# Patient Record
Sex: MALE | Race: BLACK OR AFRICAN AMERICAN | NOT HISPANIC OR LATINO | Employment: FULL TIME | URBAN - METROPOLITAN AREA
[De-identification: names, ages, dates, MRNs, and addresses within clinical notes are randomized per-mention and may not be internally consistent; named-entity substitution may affect disease eponyms.]

---

## 2017-07-03 ENCOUNTER — HOSPITAL ENCOUNTER (EMERGENCY)
Facility: CLINIC | Age: 42
Discharge: HOME | End: 2017-07-03
Attending: FAMILY MEDICINE | Admitting: FAMILY MEDICINE

## 2017-07-03 VITALS
RESPIRATION RATE: 16 BRPM | SYSTOLIC BLOOD PRESSURE: 101 MMHG | OXYGEN SATURATION: 95 % | TEMPERATURE: 100 F | DIASTOLIC BLOOD PRESSURE: 72 MMHG | WEIGHT: 151.5 LBS

## 2017-07-03 DIAGNOSIS — J02.0 ACUTE STREPTOCOCCAL PHARYNGITIS: ICD-10-CM

## 2017-07-03 LAB
DEPRECATED S PYO AG THROAT QL EIA: ABNORMAL
MICRO REPORT STATUS: ABNORMAL
SPECIMEN SOURCE: ABNORMAL

## 2017-07-03 PROCEDURE — 99284 EMERGENCY DEPT VISIT MOD MDM: CPT | Mod: GC | Performed by: FAMILY MEDICINE

## 2017-07-03 PROCEDURE — 25000132 ZZH RX MED GY IP 250 OP 250 PS 637: Performed by: STUDENT IN AN ORGANIZED HEALTH CARE EDUCATION/TRAINING PROGRAM

## 2017-07-03 PROCEDURE — 87880 STREP A ASSAY W/OPTIC: CPT | Performed by: STUDENT IN AN ORGANIZED HEALTH CARE EDUCATION/TRAINING PROGRAM

## 2017-07-03 PROCEDURE — 99283 EMERGENCY DEPT VISIT LOW MDM: CPT | Performed by: FAMILY MEDICINE

## 2017-07-03 RX ORDER — AMOXICILLIN 500 MG/1
500 CAPSULE ORAL 3 TIMES DAILY
Qty: 30 CAPSULE | Refills: 0 | Status: SHIPPED | OUTPATIENT
Start: 2017-07-03 | End: 2017-07-13

## 2017-07-03 RX ORDER — IBUPROFEN 800 MG/1
800 TABLET, FILM COATED ORAL ONCE
Status: COMPLETED | OUTPATIENT
Start: 2017-07-03 | End: 2017-07-03

## 2017-07-03 RX ADMIN — IBUPROFEN 800 MG: 800 TABLET, FILM COATED ORAL at 21:18

## 2017-07-03 ASSESSMENT — ENCOUNTER SYMPTOMS
FACIAL SWELLING: 0
NECK STIFFNESS: 0
TROUBLE SWALLOWING: 1
CHILLS: 1
FEVER: 1
NECK PAIN: 0
WHEEZING: 0
APPETITE CHANGE: 1
VOMITING: 1
SINUS PRESSURE: 0
NAUSEA: 1
COUGH: 0
SORE THROAT: 1

## 2017-07-03 NOTE — ED AVS SNAPSHOT
[facility], Emergency Department    [address]    [place] [address]    Phone:  [phone]    Fax:  [phone]                                       Praveen Shahid   MRN: [id number]    Department:  [facility], Emergency Department   Date of Visit:  7/3/2017           Patient Information     Date Of Birth          1975        Your diagnoses for this visit were:     Acute streptococcal pharyngitis        You were seen by Gene Mcghee MD.      Follow-up Information     Follow up with primary MD as needed.        Discharge Instructions         Pharyngitis: Strep (Confirmed)    You have had a positive test for strep throat. Strep throat is a contagious illness. It is spread by coughing, kissing or by touching others after touching your mouth or nose. Symptoms include throat pain that is worse with swallowing, aching all over, headache, and fever. It is treated with antibiotic medicine. This should help you start to feel better in 1 to 2 days.  Home care    Rest at home. Drink plenty of fluids to you won't get dehydrated.    No work or school for the first 2 days of taking the antibiotics. After this time, you will not be contagious. You can then return to school or work if you are feeling better.     Take antibiotic medicine for the full 10 days, even if you feel better. This is very important to ensure the infection is treated. It is also important to prevent medicine-resistant germs from developing. If you were given an antibiotic shot, you don't need any more antibiotics.    You may use acetaminophen or ibuprofen to control pain or fever, unless another medicine was prescribed for this. Talk with your doctor before taking these medicines if you have chronic liver or kidney disease. Also talk with your doctor if you have had a stomach ulcer or GI bleeding.    Throat lozenges or sprays help reduce pain. Gargling with warm saltwater will also reduce throat pain. Dissolve 1/2  teaspoon of salt in 1 glass of warm water. This may be useful just before meals.     Soft foods are OK. Avoid salty or spicy foods.  Follow-up care  Follow up with your healthcare provider or our staff if you don't get better over the next week.  When to seek medical advice  Call your healthcare provider right away if any of these occur:    Fever of 100.4 F (38 C) or higher, or as directed by your healthcare provider    New or worsening ear pain, sinus pain, or headache    Painful lumps in the back of neck    Stiff neck    Lymph nodes getting larger or becoming soft in the middle    You can't swallow liquids or you can't open your mouth wide because of throat pain    Signs of dehydration. These include very dark urine or no urine, sunken eyes, and dizziness.    Trouble breathing or noisy breathing    Muffled voice    Rash  Date Last Reviewed: 4/13/2015 2000-2017 [employer]. [address] [place] [address]. All rights reserved. This information is not intended as a substitute for professional medical care. Always follow your healthcare professional's instructions.          24 Hour Appointment Hotline       To make an appointment at any [facility], call [phone]). If you don't have a family doctor or clinic, we will help you find one. [place] clinics are conveniently located to serve the needs of you and your family.             Review of your medicines      START taking        Dose / Directions Last dose taken    amoxicillin 500 MG capsule   Commonly known as:  AMOXIL   Dose:  500 mg   Quantity:  30 capsule        Take 1 capsule (500 mg) by mouth 3 times daily for 10 days   Refills:  0          Our records show that you are taking the medicines listed below. If these are incorrect, please call your family doctor or clinic.        Dose / Directions Last dose taken    ACETAMINOPHEN PO   Dose:  1000 mg        Take 1,000 mg by mouth every 6 hours as needed for pain  "  Refills:  0                Prescriptions were sent or printed at these locations (1 Prescription)                   Other Prescriptions                Printed at Department/Unit printer (1 of 1)         amoxicillin (AMOXIL) 500 MG capsule                Procedures and tests performed during your visit     Rapid strep screen      Orders Needing Specimen Collection     None      Pending Results     No orders found from 2017 to 2017.            Pending Culture Results     No orders found from 2017 to 2017.            Pending Results Instructions     If you had any lab results that were not finalized at the time of your Discharge, you can call the ED Lab Result RN at [phone]. You will be contacted by this team for any positive Lab results or changes in treatment. The nurses are available 7 days a week from 10A to 6:30P.  You can leave a message 24 hours per day and they will return your call.        Thank you for choosing [facility]       Thank you for choosing [facility] for your care. Our goal is always to provide you with excellent care. Hearing back from our patients is one way we can continue to improve our services. Please take a few minutes to complete the written survey that you may receive in the mail after you visit with us. Thank you!        [employer] Information     [employer] lets you send messages to your doctor, view your test results, renew your prescriptions, schedule appointments and more. To sign up, go to [email or web address] . Click on \"Log in\" on the left side of the screen, which will take you to the Welcome page. Then click on \"Sign up Now\" on the right side of the page.     You will be asked to enter the access code listed below, as well as some personal information. Please follow the directions to create your username and password.     Your access code is: [email or web address]  Expires: 10/1/2017 10:45 PM     Your access code will  in 90 days. If you need help or a new code, " please call your [facility] [place] clinic or [phone].        Care EveryWhere ID     This is your Care EveryWhere ID. This could be used by other organizations to access your [facility] [place] medical records  [id number]        Equal Access to Services     GWEN EDGE : Blessing Cheatham, waalexandroda luqadaha, qaybta kaalmada annmarie, pacheco hathaway. So [facility] [phone].    ATENCIÓN: Si habla español, tiene a aguilar disposición servicios gratuitos de asistencia lingüística. Llame al [phone].    We comply with applicable federal civil rights laws and [place] laws. We do not discriminate on the basis of race, color, national origin, age, disability sex, sexual orientation or gender identity.            After Visit Summary       This is your record. Keep this with you and show to your community pharmacist(s) and doctor(s) at your next visit.

## 2017-07-03 NOTE — ED AVS SNAPSHOT
[facility], [place], Emergency Department    [address]    [facility] [address]    Phone:  [phone]    Fax:  [phone]                                       Praveen Shahid   MRN: [id number]    Department:  [facility], Emergency Department   Date of Visit:  7/3/2017           After Visit Summary Signature Page     I have received my discharge instructions, and my questions have been answered. I have discussed any challenges I see with this plan with the nurse or doctor.    ..........................................................................................................................................  Patient/Patient Representative Signature      ..........................................................................................................................................  Patient Representative Print Name and Relationship to Patient    ..................................................               ................................................  Date                                            Time    ..........................................................................................................................................  Reviewed by Signature/Title    ...................................................              ..............................................  Date                                                            Time

## 2017-07-04 NOTE — DISCHARGE INSTRUCTIONS
Pharyngitis: Strep (Confirmed)    You have had a positive test for strep throat. Strep throat is a contagious illness. It is spread by coughing, kissing or by touching others after touching your mouth or nose. Symptoms include throat pain that is worse with swallowing, aching all over, headache, and fever. It is treated with antibiotic medicine. This should help you start to feel better in 1 to 2 days.  Home care    Rest at home. Drink plenty of fluids to you won't get dehydrated.    No work or school for the first 2 days of taking the antibiotics. After this time, you will not be contagious. You can then return to school or work if you are feeling better.     Take antibiotic medicine for the full 10 days, even if you feel better. This is very important to ensure the infection is treated. It is also important to prevent medicine-resistant germs from developing. If you were given an antibiotic shot, you don't need any more antibiotics.    You may use acetaminophen or ibuprofen to control pain or fever, unless another medicine was prescribed for this. Talk with your doctor before taking these medicines if you have chronic liver or kidney disease. Also talk with your doctor if you have had a stomach ulcer or GI bleeding.    Throat lozenges or sprays help reduce pain. Gargling with warm saltwater will also reduce throat pain. Dissolve 1/2 teaspoon of salt in 1 glass of warm water. This may be useful just before meals.     Soft foods are OK. Avoid salty or spicy foods.  Follow-up care  Follow up with your healthcare provider or our staff if you don't get better over the next week.  When to seek medical advice  Call your healthcare provider right away if any of these occur:    Fever of 100.4 F (38 C) or higher, or as directed by your healthcare provider    New or worsening ear pain, sinus pain, or headache    Painful lumps in the back of neck    Stiff neck    Lymph nodes getting larger or becoming soft in the  middle    You can't swallow liquids or you can't open your mouth wide because of throat pain    Signs of dehydration. These include very dark urine or no urine, sunken eyes, and dizziness.    Trouble breathing or noisy breathing    Muffled voice    Rash  Date Last Reviewed: 4/13/2015 2000-2017 [employer]. [address] [place] [address], [place] [address]. All rights reserved. This information is not intended as a substitute for professional medical care. Always follow your healthcare professional's instructions.

## 2017-07-04 NOTE — ED NOTES
Started on Saturday afternoon with sore throat and fever.  Now getting worse.  Nausea no vomiting, very tired.

## 2017-07-04 NOTE — ED PROVIDER NOTES
"  History     Chief Complaint   Patient presents with     Pharyngitis     Fever     HPI  Praveen Shahid is a 41 year old male who presents with a chief complaint of sore throat since Saturday at 8pm.  He never took his temperature at home, but had chills and \"broke\" the fever with sweating after taking tylenol which was last taken at 5pm today.  He has been nauseous with one episode of \"green\" vomit because he hasn't been able to eat much.  He states he has been able to drink fluids.  His wife and kids have not been sick and he has been staying away from them.  No cough, but has a stuffy nose.    I have reviewed the Medications, Allergies, Past Medical and Surgical History, and Social History in the Epic system.    Review of Systems   Constitutional: Positive for appetite change, chills and fever.   HENT: Positive for congestion, sore throat and trouble swallowing. Negative for ear pain, facial swelling and sinus pressure.    Respiratory: Negative for cough and wheezing.    Gastrointestinal: Positive for nausea and vomiting (One episode today).   Musculoskeletal: Negative for neck pain and neck stiffness.       Physical Exam   BP: 101/72  Heart Rate: 95  Temp: 100.2  F (37.9  C)  Resp: 16  Weight: 68.7 kg (151 lb 8 oz)  SpO2: 95 %  Physical Exam   Constitutional: He is oriented to person, place, and time. He appears well-developed and well-nourished. He appears distressed (Mild ).   HENT:   Pharyngeal inflammation without exudate and enlarged tonsils   Neck: Neck supple.   No nuchal rigidity   Cardiovascular: Normal rate.    Pulmonary/Chest: Effort normal and breath sounds normal.   Lymphadenopathy:     He has cervical adenopathy (Anterior).   Neurological: He is alert and oriented to person, place, and time.   Skin:   Slight delay in capillary refill and skin tenting       ED Course     ED Course   Rapid strep test ordered  Procedures               Labs Ordered and Resulted from Time of ED Arrival Up to the Time of " Departure from the ED - No data to display         Assessments & Plan (with Medical Decision Making)   Praveen Shahid is a 41 year old male who present with a sore throat and fever for 48 hours.  Rapid test was positive for acute streptococcal pharyngitis.    I have reviewed the nursing notes.    I have reviewed the findings, diagnosis, plan and need for follow up with the patient.  This data collected with the Resident working in the Emergency Department.  Patient was seen and evaluated by myself and I repeated the history and physical exam with the patient.  The plan of care was discussed with them.  The key portions of the note including the entire assessment and plan reflect my documentation.          Discharge Medication List as of 7/3/2017 10:46 PM      START taking these medications    Details   amoxicillin (AMOXIL) 500 MG capsule Take 1 capsule (500 mg) by mouth 3 times daily for 10 days, Disp-30 capsule, R-0, Local Print             Final diagnoses:   Acute streptococcal pharyngitis       7/3/2017   [facility], [place], EMERGENCY DEPARTMENT    Evelyn Bacon, FP1     Gene Mcghee MD  07/06/17 1527

## 2018-03-12 ENCOUNTER — OFFICE VISIT (OUTPATIENT)
Dept: URGENT CARE | Facility: URGENT CARE | Age: 43
End: 2018-03-12
Payer: COMMERCIAL

## 2018-03-12 VITALS
OXYGEN SATURATION: 96 % | SYSTOLIC BLOOD PRESSURE: 123 MMHG | DIASTOLIC BLOOD PRESSURE: 69 MMHG | TEMPERATURE: 98.4 F | HEART RATE: 79 BPM | WEIGHT: 164.6 LBS

## 2018-03-12 DIAGNOSIS — R50.9 FEVER, UNSPECIFIED FEVER CAUSE: Primary | ICD-10-CM

## 2018-03-12 DIAGNOSIS — B34.9 VIRAL SYNDROME: ICD-10-CM

## 2018-03-12 LAB
BASOPHILS # BLD AUTO: 0 10E9/L (ref 0–0.2)
BASOPHILS NFR BLD AUTO: 0.2 %
DIFFERENTIAL METHOD BLD: ABNORMAL
EOSINOPHIL # BLD AUTO: 0 10E9/L (ref 0–0.7)
EOSINOPHIL NFR BLD AUTO: 0.6 %
ERYTHROCYTE [DISTWIDTH] IN BLOOD BY AUTOMATED COUNT: 13 % (ref 10–15)
HCT VFR BLD AUTO: 40.5 % (ref 40–53)
HGB BLD-MCNC: 13.6 G/DL (ref 13.3–17.7)
LYMPHOCYTES # BLD AUTO: 1.4 10E9/L (ref 0.8–5.3)
LYMPHOCYTES NFR BLD AUTO: 22.6 %
MCH RBC QN AUTO: 31.4 PG (ref 26.5–33)
MCHC RBC AUTO-ENTMCNC: 33.6 G/DL (ref 31.5–36.5)
MCV RBC AUTO: 94 FL (ref 78–100)
MONOCYTES # BLD AUTO: 1 10E9/L (ref 0–1.3)
MONOCYTES NFR BLD AUTO: 15.7 %
NEUTROPHILS # BLD AUTO: 3.8 10E9/L (ref 1.6–8.3)
NEUTROPHILS NFR BLD AUTO: 60.9 %
PLATELET # BLD AUTO: 171 10E9/L (ref 150–450)
RBC # BLD AUTO: 4.33 10E12/L (ref 4.4–5.9)
WBC # BLD AUTO: 6.3 10E9/L (ref 4–11)

## 2018-03-12 PROCEDURE — 85025 COMPLETE CBC W/AUTO DIFF WBC: CPT | Performed by: PHYSICIAN ASSISTANT

## 2018-03-12 PROCEDURE — 36415 COLL VENOUS BLD VENIPUNCTURE: CPT | Performed by: PHYSICIAN ASSISTANT

## 2018-03-12 PROCEDURE — 99213 OFFICE O/P EST LOW 20 MIN: CPT | Performed by: PHYSICIAN ASSISTANT

## 2018-03-12 NOTE — MR AVS SNAPSHOT
"              After Visit Summary   3/12/2018    Asa Shahid    MRN: [id number]           Patient Information     Date Of Birth          1975        Visit Information        Provider Department      3/12/2018 7:40 PM Corinne Villanueva PA-C [facility]        Today's Diagnoses     Fever, unspecified fever cause    -  1    Viral syndrome           Follow-ups after your visit        Who to contact     If you have questions or need follow up information about today's clinic visit or your schedule please contact [facility] directly at [phone].  Normal or non-critical lab and imaging results will be communicated to you by [facility], letter or phone within 4 business days after the clinic has received the results. If you do not hear from us within 7 days, please contact the clinic through [facility] or phone. If you have a critical or abnormal lab result, we will notify you by phone as soon as possible.  Submit refill requests through [facility] [employer] or call your pharmacy and they will forward the refill request to us. Please allow 3 business days for your refill to be completed.          Additional Information About Your Visit        MyChart Information     [facility] [employer] lets you send messages to your doctor, view your test results, renew your prescriptions, schedule appointments and more. To sign up, go to [email or web address] . Click on \"Log in\" on the left side of the screen, which will take you to the Welcome page. Then click on \"Sign up Now\" on the right side of the page.     You will be asked to enter the access code listed below, as well as some personal information. Please follow the directions to create your username and password.     Your access code is: [email or web address]  Expires: 6/10/2018  9:28 PM     Your access code will  in 90 days. If you need help or a new code, please call your [facility] or [phone].        Care EveryWhere ID     This is your Care " EveryWhere ID. This could be used by other organizations to access your [facility] medical records  [id number]        Your Vitals Were     Pulse Temperature Pulse Oximetry             79 98.4  F (36.9  C) (Oral) 96%          Blood Pressure from Last 3 Encounters:   03/12/18 123/69   07/03/17 101/72   08/29/16 109/76    Weight from Last 3 Encounters:   03/12/18 164 lb 9.6 oz (74.7 kg)   07/03/17 151 lb 8 oz (68.7 kg)   08/29/16 156 lb (70.8 kg)              We Performed the Following     CBC with platelets differential        Primary Care Provider Fax #    Physician No Ref-Primary [phone]       No address on file        Equal Access to Services     GWEN EDGE : Blessing Cheatham, linda almaraz, qaberkley kaalmada annmarie, pacheco sabillon . So [facility] [phone].    ATENCIÓN: Si habla español, tiene a aguilar disposición servicios gratuitos de asistencia lingüística. Llame al [phone].    We comply with applicable federal civil rights laws and [place] laws. We do not discriminate on the basis of race, color, national origin, age, disability, sex, sexual orientation, or gender identity.            Thank you!     Thank you for choosing [facility]  for your care. Our goal is always to provide you with excellent care. Hearing back from our patients is one way we can continue to improve our services. Please take a few minutes to complete the written survey that you may receive in the mail after your visit with us. Thank you!             Your Updated Medication List - Protect others around you: Learn how to safely use, store and throw away your medicines at www.disposemymeds.org.          This list is accurate as of 3/12/18  9:28 PM.  Always use your most recent med list.                   Brand Name Dispense Instructions for use Diagnosis    ACETAMINOPHEN PO      Take 1,000 mg by mouth every 6 hours as needed for pain        loratadine 10 MG tablet     CLARITIN     Take 10 mg by mouth daily

## 2018-03-13 NOTE — PROGRESS NOTES
S: He says he has had a high fever  For 3 days. Felt warm, did not take jhis temp. Some chills. No other symptoms.      Allergies   Allergen Reactions     Seasonal Allergies        No past medical history on file.      Current Outpatient Prescriptions on File Prior to Visit:  ACETAMINOPHEN PO Take 1,000 mg by mouth every 6 hours as needed for pain   loratadine (CLARITIN) 10 MG tablet Take 10 mg by mouth daily     No current facility-administered medications on file prior to visit.     Social History   Substance Use Topics     Smoking status: Never Smoker     Smokeless tobacco: Never Used     Alcohol use No       ROS:  CONSTITUTIONAL: Negative for fatigue or fever.  EYES: Negative for eye problems.  ENT:No ST.  RESP: No cough..  CV: Negative for chest pains.  GI: Negative for vomiting.  MUSCULOSKELETAL:  Negative for significant muscle or joint pains.  NEUROLOGIC: Negative for headaches.  SKIN: Negative for rash.  - no dysuria    OBJECTIVE:  /69 (BP Location: Left arm, Patient Position: Chair, Cuff Size: Adult Regular)  Pulse 79  Temp 98.4  F (36.9  C) (Oral)  Wt 164 lb 9.6 oz (74.7 kg)  SpO2 96%  GENERAL APPEARANCE: Healthy, alert and no distress.  EYES:Conjunctiva/sclera clear.  EARS: No cerumen.   Ear canals w/o erythema.  TM's intact w/o erythema.    NOSE/MOUTH: Nose without ulcers, erythema or lesions.  SINUSES: No maxillary sinus tenderness.  THROAT: No erythema w/o tonsillar enlargement . No exudates.  NECK: Supple, nontender, no lymphadenopathy.  RESP: Lungs clear to auscultation - no rales, rhonchi or wheezes  CV: Regular rate and rhythm, normal S1 S2, no murmur noted.  NEURO: Awake, alert    SKIN: No rashes  Abd- soft, NT, no HSM  Results for orders placed or performed in visit on 03/12/18   CBC with platelets differential   Result Value Ref Range    WBC 6.3 4.0 - 11.0 10e9/L    RBC Count 4.33 (L) 4.4 - 5.9 10e12/L    Hemoglobin 13.6 13.3 - 17.7 g/dL    Hematocrit 40.5 40.0 - 53.0 %    MCV 94 78  - 100 fl    MCH 31.4 26.5 - 33.0 pg    MCHC 33.6 31.5 - 36.5 g/dL    RDW 13.0 10.0 - 15.0 %    Platelet Count 171 150 - 450 10e9/L    Diff Method Automated Method     % Neutrophils 60.9 %    % Lymphocytes 22.6 %    % Monocytes 15.7 %    % Eosinophils 0.6 %    % Basophils 0.2 %    Absolute Neutrophil 3.8 1.6 - 8.3 10e9/L    Absolute Lymphocytes 1.4 0.8 - 5.3 10e9/L    Absolute Monocytes 1.0 0.0 - 1.3 10e9/L    Absolute Eosinophils 0.0 0.0 - 0.7 10e9/L    Absolute Basophils 0.0 0.0 - 0.2 10e9/L         ASSESSMENT:       ICD-10-CM    1. Fever, unspecified fever cause R50.9 CBC with platelets differential   2. Viral syndrome B34.9        PLAN:Observe.  Lots of rest and fluids.  RTC if any worsening symptoms or if not improving.    Corinne Villanueva PA-C

## 2018-03-13 NOTE — NURSING NOTE
Chief Complaint   Patient presents with     Fever     Patient complains of fever x 3 days       Initial /69 (BP Location: Left arm, Patient Position: Chair, Cuff Size: Adult Regular)  Pulse 79  Temp 98.4  F (36.9  C) (Oral)  Wt 164 lb 9.6 oz (74.7 kg)  SpO2 96% There is no height or weight on file to calculate BMI.  Medication Reconciliation: complete       Yoko Campbell

## 2018-05-31 ENCOUNTER — OFFICE VISIT (OUTPATIENT)
Dept: URGENT CARE | Facility: URGENT CARE | Age: 43
End: 2018-05-31
Payer: COMMERCIAL

## 2018-05-31 VITALS
HEART RATE: 79 BPM | WEIGHT: 162.2 LBS | RESPIRATION RATE: 16 BRPM | DIASTOLIC BLOOD PRESSURE: 69 MMHG | TEMPERATURE: 98.4 F | SYSTOLIC BLOOD PRESSURE: 101 MMHG | OXYGEN SATURATION: 99 %

## 2018-05-31 DIAGNOSIS — R14.2 FLATULENCE, ERUCTATION AND GAS PAIN: Primary | ICD-10-CM

## 2018-05-31 DIAGNOSIS — R14.1 FLATULENCE, ERUCTATION AND GAS PAIN: Primary | ICD-10-CM

## 2018-05-31 DIAGNOSIS — R14.3 FLATULENCE, ERUCTATION AND GAS PAIN: Primary | ICD-10-CM

## 2018-05-31 PROCEDURE — 99213 OFFICE O/P EST LOW 20 MIN: CPT | Performed by: NURSE PRACTITIONER

## 2018-05-31 RX ORDER — SIMETHICONE 80 MG
80 TABLET,CHEWABLE ORAL EVERY 6 HOURS PRN
Qty: 40 TABLET | Refills: 0 | Status: SHIPPED | OUTPATIENT
Start: 2018-05-31 | End: 2018-06-05

## 2018-05-31 ASSESSMENT — ENCOUNTER SYMPTOMS
VOMITING: 0
SHORTNESS OF BREATH: 0
RHINORRHEA: 0
ROS GI COMMENTS: BLOATING AND GAS
SORE THROAT: 0
NAUSEA: 0
CHILLS: 0
DIARRHEA: 0
COUGH: 0
DIAPHORESIS: 0
FEVER: 0

## 2018-05-31 NOTE — PROGRESS NOTES
SUBJECTIVE:   Asa Shahid is a 42 year old male presenting with a chief complaint of   Chief Complaint   Patient presents with     Abdominal Pain     Patient complains of stomach ache and frequent bowel movements. Patient just got back from [place] on Saturday ( 6 days ago)       He is an established patient of [facility].    Gastro    Onset of symptoms was 6 day(s) ago.  Course of illness is same.    Severity moderate  Current and Associated symptoms:  Bloating and gas in stomach  Aggravating factors: nothing.    Alleviating factors:nothing  Diarrhea: No  Stools: frequent but normal  Vomitting: No  Appetite: normal  Risk factors: travel       Review of Systems   Constitutional: Negative for chills, diaphoresis and fever.   HENT: Negative for congestion, ear pain, rhinorrhea and sore throat.    Respiratory: Negative for cough and shortness of breath.    Gastrointestinal: Negative for diarrhea, nausea and vomiting.        Bloating and gas       History reviewed. No pertinent past medical history.  History reviewed. No pertinent family history.  Current Outpatient Prescriptions   Medication Sig Dispense Refill     ACETAMINOPHEN PO Take 1,000 mg by mouth every 6 hours as needed for pain       loratadine (CLARITIN) 10 MG tablet Take 10 mg by mouth daily       Social History   Substance Use Topics     Smoking status: Never Smoker     Smokeless tobacco: Never Used     Alcohol use No       OBJECTIVE  /69  Pulse 79  Temp 98.4  F (36.9  C) (Oral)  Resp 16  Wt 162 lb 3.2 oz (73.6 kg)  SpO2 99%    Physical Exam   Constitutional: He appears well-developed and well-nourished. No distress.   HENT:   Head: Normocephalic and atraumatic.   Right Ear: Tympanic membrane and external ear normal.   Left Ear: Tympanic membrane and external ear normal.   Mouth/Throat: Oropharynx is clear and moist.   Eyes: EOM are normal. Pupils are equal, round, and reactive to light.   Neck: Normal range of motion. Neck supple.    Pulmonary/Chest: Effort normal and breath sounds normal. No respiratory distress.   Lymphadenopathy:     He has no cervical adenopathy.   Neurological: He is alert. No cranial nerve deficit.   Skin: Skin is warm and dry. He is not diaphoretic.   Psychiatric: He has a normal mood and affect.   Nursing note and vitals reviewed.        ASSESSMENT:      ICD-10-CM    1. Flatulence, eructation and gas pain R14.3     R14.1     R14.2       PLAN:  I recommend follow up with PCP in 3 days or sooner if symptoms are getting worse  Advised to avoid food that cause gas, and drink a lot of fluids.  Side effects of medications discussed  Over the counter medications discussed  All questions are answered and patient is in agreement with treatment plan  Zohra Sanchez  [facility]-BC  Family Nurse Practitoner

## 2018-05-31 NOTE — PATIENT INSTRUCTIONS
Simethicone chewable tablets  Brand Names: Gas Relief, Gas-X, Gas-X Extra Strength, Genasyme, Mylanta Gas, Mytab Gas, Phazyme  What is this medicine?  SIMETHICONE (sye METH i trish) is used to decrease the discomfort caused by gas.  How should I use this medicine?  Take this medicine by mouth. Crush or chew the tablets. Do not swallow them whole. Follow the directions on the label or those given to you by your doctor or health care professional. Do not take your medicine more often than directed.  Talk to your pediatrician regarding the use of this medicine in children. While this medicine may be used in children as young as 12 years for selected conditions, precautions do apply.  What side effects may I notice from receiving this medicine?  There are no reported side effects of this medicine.  What may interact with this medicine?  Interactions are not expected.  What if I miss a dose?  This does not apply. You will only use this medicine as needed for gas pain. Do not use double or extra doses.  Where should I keep my medicine?  Keep out of the reach of children.  Store at room temperature between 15 and 30 degrees C (59 and 86 degrees F). Keep container tightly closed. Throw away any unused medicine after the expiration date.  What should I tell my health care provider before I take this medicine?  They need to know if you have any of these conditions:    phenylketonuria    an unusual or allergic reaction to simethicone, other medicines, foods, dyes, or preservatives    pregnant or trying to get pregnant    breast-feeding  What should I watch for while using this medicine?  Tell your doctor or health care professional if your symptoms get worse, or if you have severe pain, diarrhea, constipation, or blood in your stool. These could be signs of a more serious condition.  NOTE:This sheet is a summary. It may not cover all possible information. If you have questions about this medicine, talk to your doctor,  pharmacist, or health care provider. Copyright  2018 Elsevier

## 2018-05-31 NOTE — MR AVS SNAPSHOT
After Visit Summary   5/31/2018    Asa Shahid    MRN: [id number]           Patient Information     Date Of Birth          1975        Visit Information        Provider Department      5/31/2018 1:30 PM Zohra Sanchez NP [facility]        Today's Diagnoses     Flatulence, eructation and gas pain    -  1      Care Instructions      Simethicone chewable tablets  Brand Names: Gas Relief, Gas-X, Gas-X Extra Strength, Genasyme, Mylanta Gas, Mytab Gas, Phazyme  What is this medicine?  SIMETHICONE (sye METH i trish) is used to decrease the discomfort caused by gas.  How should I use this medicine?  Take this medicine by mouth. Crush or chew the tablets. Do not swallow them whole. Follow the directions on the label or those given to you by your doctor or health care professional. Do not take your medicine more often than directed.  Talk to your pediatrician regarding the use of this medicine in children. While this medicine may be used in children as young as 12 years for selected conditions, precautions do apply.  What side effects may I notice from receiving this medicine?  There are no reported side effects of this medicine.  What may interact with this medicine?  Interactions are not expected.  What if I miss a dose?  This does not apply. You will only use this medicine as needed for gas pain. Do not use double or extra doses.  Where should I keep my medicine?  Keep out of the reach of children.  Store at room temperature between 15 and 30 degrees C (59 and 86 degrees F). Keep container tightly closed. Throw away any unused medicine after the expiration date.  What should I tell my health care provider before I take this medicine?  They need to know if you have any of these conditions:    phenylketonuria    an unusual or allergic reaction to simethicone, other medicines, foods, dyes, or preservatives    pregnant or trying to get pregnant    breast-feeding  What should I watch for  "while using this medicine?  Tell your doctor or health care professional if your symptoms get worse, or if you have severe pain, diarrhea, constipation, or blood in your stool. These could be signs of a more serious condition.  NOTE:This sheet is a summary. It may not cover all possible information. If you have questions about this medicine, talk to your doctor, pharmacist, or health care provider. Copyright  2018 Elsevier                Follow-ups after your visit        Who to contact     If you have questions or need follow up information about today's clinic visit or your schedule please contact [facility] directly at [phone].  Normal or non-critical lab and imaging results will be communicated to you by [employer], letter or phone within 4 business days after the clinic has received the results. If you do not hear from us within 7 days, please contact the clinic through [employer] or phone. If you have a critical or abnormal lab result, we will notify you by phone as soon as possible.  Submit refill requests through [employer] or call your pharmacy and they will forward the refill request to us. Please allow 3 business days for your refill to be completed.          Additional Information About Your Visit        [employer] Information     [employer] lets you send messages to your doctor, view your test results, renew your prescriptions, schedule appointments and more. To sign up, go to [email or web address] . Click on \"Log in\" on the left side of the screen, which will take you to the Welcome page. Then click on \"Sign up Now\" on the right side of the page.     You will be asked to enter the access code listed below, as well as some personal information. Please follow the directions to create your username and password.     Your access code is: [email or web address]  Expires: 6/10/2018  9:28 PM     Your access code will  in 90 days. If you need help or a new code, please call your [facility] or " [phone].        Care EveryWhere ID     This is your Care EveryWhere ID. This could be used by other organizations to access your [facility] medical records  [id number]        Your Vitals Were     Pulse Temperature Respirations Pulse Oximetry          79 98.4  F (36.9  C) (Oral) 16 99%         Blood Pressure from Last 3 Encounters:   05/31/18 101/69   03/12/18 123/69   07/03/17 101/72    Weight from Last 3 Encounters:   05/31/18 162 lb 3.2 oz (73.6 kg)   03/12/18 164 lb 9.6 oz (74.7 kg)   07/03/17 151 lb 8 oz (68.7 kg)              Today, you had the following     No orders found for display         Today's Medication Changes          These changes are accurate as of 5/31/18  2:16 PM.  If you have any questions, ask your nurse or doctor.               Start taking these medicines.        Dose/Directions    simethicone 80 MG chewable tablet   Commonly known as:  MYLICON   Used for:  Flatulence, eructation and gas pain   Started by:  Zohra Sanchez NP        Dose:  80 mg   Take 1 tablet (80 mg) by mouth every 6 hours as needed for flatulence or cramping   Quantity:  40 tablet   Refills:  0            Where to get your medicines      These medications were sent to [facility] [address] [place] [address] - [address] AT [facility] [address], [facility] [address]     Phone:  [phone]     simethicone 80 MG chewable tablet                Primary Care Provider Fax #    Physician No Ref-Primary [phone]       No address on file        Equal Access to Services     GWEN EDGE AH: Hadii larry [place], waalexandroda luqadaha, qaybta kaalmapacheco medrano. So [facility] [phone].    ATENCIÓN: Si habla español, tiene a aguilar disposición servicios gratuitos de asistencia lingüística. Llame al [phone].    We comply with applicable federal civil rights laws and [place] laws. We do not discriminate on the basis of race, color, national  origin, age, disability, sex, sexual orientation, or gender identity.            Thank you!     Thank you for choosing [facility]  for your care. Our goal is always to provide you with excellent care. Hearing back from our patients is one way we can continue to improve our services. Please take a few minutes to complete the written survey that you may receive in the mail after your visit with us. Thank you!             Your Updated Medication List - Protect others around you: Learn how to safely use, store and throw away your medicines at www.disposemymeds.org.          This list is accurate as of 5/31/18  2:16 PM.  Always use your most recent med list.                   Brand Name Dispense Instructions for use Diagnosis    ACETAMINOPHEN PO      Take 1,000 mg by mouth every 6 hours as needed for pain        loratadine 10 MG tablet    CLARITIN     Take 10 mg by mouth daily        simethicone 80 MG chewable tablet    MYLICON    40 tablet    Take 1 tablet (80 mg) by mouth every 6 hours as needed for flatulence or cramping    Flatulence, eructation and gas pain

## 2018-07-01 ENCOUNTER — OFFICE VISIT (OUTPATIENT)
Dept: URGENT CARE | Facility: URGENT CARE | Age: 43
End: 2018-07-01
Payer: COMMERCIAL

## 2018-07-01 VITALS
SYSTOLIC BLOOD PRESSURE: 116 MMHG | OXYGEN SATURATION: 97 % | TEMPERATURE: 98.6 F | DIASTOLIC BLOOD PRESSURE: 71 MMHG | HEART RATE: 76 BPM | WEIGHT: 160.2 LBS | RESPIRATION RATE: 16 BRPM

## 2018-07-01 DIAGNOSIS — J02.0 STREP THROAT: ICD-10-CM

## 2018-07-01 DIAGNOSIS — R07.0 THROAT PAIN: Primary | ICD-10-CM

## 2018-07-01 LAB
DEPRECATED S PYO AG THROAT QL EIA: ABNORMAL
SPECIMEN SOURCE: ABNORMAL

## 2018-07-01 PROCEDURE — 87880 STREP A ASSAY W/OPTIC: CPT | Performed by: PHYSICIAN ASSISTANT

## 2018-07-01 PROCEDURE — 99213 OFFICE O/P EST LOW 20 MIN: CPT | Performed by: PHYSICIAN ASSISTANT

## 2018-07-01 ASSESSMENT — ENCOUNTER SYMPTOMS
NEUROLOGICAL NEGATIVE: 1
POLYDIPSIA: 0
ABDOMINAL PAIN: 0
DIAPHORESIS: 0
WHEEZING: 0
EYE DISCHARGE: 0
RHINORRHEA: 0
ENDOCRINE NEGATIVE: 1
EYE ITCHING: 0
SHORTNESS OF BREATH: 0
GASTROINTESTINAL NEGATIVE: 1
CARDIOVASCULAR NEGATIVE: 1
COUGH: 0
FREQUENCY: 0
DYSURIA: 0
CHILLS: 0
EYE REDNESS: 0
DIARRHEA: 0
WEAKNESS: 0
HEADACHES: 0
SORE THROAT: 1
FEVER: 0
MUSCULOSKELETAL NEGATIVE: 1
LIGHT-HEADEDNESS: 0
HEMATURIA: 0
ADENOPATHY: 0
CONSTITUTIONAL NEGATIVE: 1
RESPIRATORY NEGATIVE: 1
VOMITING: 0
CHEST TIGHTNESS: 0
DIZZINESS: 0
EYES NEGATIVE: 1
PALPITATIONS: 0
MYALGIAS: 0
NAUSEA: 0

## 2018-07-01 NOTE — MR AVS SNAPSHOT
"              After Visit Summary   2018    Asa Shahid    MRN: [id number]           Patient Information     Date Of Birth          1975        Visit Information        Provider Department      2018 12:15 PM Phong Baxter PA-C [facility]        Today's Diagnoses     Throat pain    -  1    Strep throat           Follow-ups after your visit        Who to contact     If you have questions or need follow up information about today's clinic visit or your schedule please contact [facility] directly at [phone].  Normal or non-critical lab and imaging results will be communicated to you by [employer], letter or phone within 4 business days after the clinic has received the results. If you do not hear from us within 7 days, please contact the clinic through [employer] or phone. If you have a critical or abnormal lab result, we will notify you by phone as soon as possible.  Submit refill requests through [employer] or call your pharmacy and they will forward the refill request to us. Please allow 3 business days for your refill to be completed.          Additional Information About Your Visit        MyChart Information     [employer] lets you send messages to your doctor, view your test results, renew your prescriptions, schedule appointments and more. To sign up, go to [email or web address] . Click on \"Log in\" on the left side of the screen, which will take you to the Welcome page. Then click on \"Sign up Now\" on the right side of the page.     You will be asked to enter the access code listed below, as well as some personal information. Please follow the directions to create your username and password.     Your access code is: [email or web address]  Expires: 2018 12:40 PM     Your access code will  in 90 days. If you need help or a new code, please call your [facility] or [phone].        Care EveryWhere ID     This is your Care EveryWhere ID. This could " be used by other organizations to access your [facility] medical records  [id number]        Your Vitals Were     Pulse Temperature Respirations Pulse Oximetry          76 98.6  F (37  C) (Oral) 16 97%         Blood Pressure from Last 3 Encounters:   07/01/18 116/71   05/31/18 101/69   03/12/18 123/69    Weight from Last 3 Encounters:   07/01/18 160 lb 3.2 oz (72.7 kg)   05/31/18 162 lb 3.2 oz (73.6 kg)   03/12/18 164 lb 9.6 oz (74.7 kg)              We Performed the Following     Strep, Rapid Screen          Today's Medication Changes          These changes are accurate as of 7/1/18 12:40 PM.  If you have any questions, ask your nurse or doctor.               Start taking these medicines.        Dose/Directions    amoxicillin-clavulanate 875-125 MG per tablet   Commonly known as:  AUGMENTIN   Used for:  Strep throat   Started by:  Phong Baxter PA-C        Dose:  1 tablet   Take 1 tablet by mouth 2 times daily for 10 days   Quantity:  20 tablet   Refills:  0            Where to get your medicines      These medications were sent to [facility] [id number] [place] [address] - [address] AT [facility] [address], [facility] [address]     Phone:  [phone]     amoxicillin-clavulanate 875-125 MG per tablet                Primary Care Provider Fax #    Physician No Ref-Primary [phone]       No address on file        Equal Access to Services     GWEN EDGE AH: Hadii larry ku hadasho Soomaali, waaxda luqadaha, qaybta kaalmada adeegyada, waxay idiin hayaan adeeg kharash la'aan . So [facility] [phone].    ATENCIÓN: Si habla español, tiene a aguilar disposición servicios gratuitos de asistencia lingüística. Llame al [phone].    We comply with applicable federal civil rights laws and [place] laws. We do not discriminate on the basis of race, color, national origin, age, disability, sex, sexual orientation, or gender identity.            Thank you!     Thank you for choosing  [facility]  for your care. Our goal is always to provide you with excellent care. Hearing back from our patients is one way we can continue to improve our services. Please take a few minutes to complete the written survey that you may receive in the mail after your visit with us. Thank you!             Your Updated Medication List - Protect others around you: Learn how to safely use, store and throw away your medicines at www.disposemymeds.org.          This list is accurate as of 7/1/18 12:40 PM.  Always use your most recent med list.                   Brand Name Dispense Instructions for use Diagnosis    ACETAMINOPHEN PO      Take 1,000 mg by mouth every 6 hours as needed for pain        amoxicillin-clavulanate 875-125 MG per tablet    AUGMENTIN    20 tablet    Take 1 tablet by mouth 2 times daily for 10 days    Strep throat       loratadine 10 MG tablet    CLARITIN     Take 10 mg by mouth daily

## 2018-07-01 NOTE — PROGRESS NOTES
Chief Complaint    Chief Complaint   Patient presents with     Pharyngitis     Patient complains of sore throat x1       HPI  Asa Shahid is an 42 year old male who presents for evaluation and treatment of sore throat.  Onset 1 days, gradually worsening since that time. Known Strep exposure: none.    Other symptoms include body aches.    Patient is eating well with no problems swallowing.  Patient is urinating regularly.     ROS:      Review of Systems   Constitutional: Negative.  Negative for chills, diaphoresis and fever.   HENT: Positive for sore throat. Negative for congestion, ear pain and rhinorrhea.    Eyes: Negative.  Negative for discharge, redness and itching.   Respiratory: Negative.  Negative for cough, chest tightness, shortness of breath and wheezing.    Cardiovascular: Negative.  Negative for chest pain and palpitations.   Gastrointestinal: Negative.  Negative for abdominal pain, diarrhea, nausea and vomiting.   Endocrine: Negative.  Negative for polydipsia and polyuria.   Genitourinary: Negative for dysuria, frequency, hematuria and urgency.   Musculoskeletal: Negative.  Negative for myalgias.   Skin: Negative for rash.   Allergic/Immunologic: Negative for immunocompromised state.   Neurological: Negative.  Negative for dizziness, weakness, light-headedness and headaches.   Hematological: Negative for adenopathy.         No pertinent family Hx at this time.  Patient has never smoked and is not exposed to second hand smoke at home.     Family History   History reviewed. No pertinent family history.     Problem history  There is no problem list on file for this patient.       Allergies  Allergies   Allergen Reactions     Seasonal Allergies         Social History  Social History     Social History     Marital status:      Spouse name: N/A     Number of children: N/A     Years of education: N/A     Occupational History     Not on file.     Social History Main Topics     Smoking status: Never  Smoker     Smokeless tobacco: Never Used     Alcohol use No     Drug use: No     Sexual activity: Not on file     Other Topics Concern     Not on file     Social History Narrative    ** Merged History Encounter **             Current Meds    Current Outpatient Prescriptions:      amoxicillin-clavulanate (AUGMENTIN) 875-125 MG per tablet, Take 1 tablet by mouth 2 times daily for 10 days, Disp: 20 tablet, Rfl: 0     ACETAMINOPHEN PO, Take 1,000 mg by mouth every 6 hours as needed for pain, Disp: , Rfl:      loratadine (CLARITIN) 10 MG tablet, Take 10 mg by mouth daily, Disp: , Rfl:     OBJECTIVE     Vital signs noted and reviewed by Phong Baxter  /71  Pulse 76  Temp 98.6  F (37  C) (Oral)  Resp 16  Wt 160 lb 3.2 oz (72.7 kg)  SpO2 97%     PEFR:  General appearance: healthy, alert and no distress  Ears: R TM - normal: no effusions, no erythema, and normal landmarks, L TM - normal: no effusions, no erythema, and normal landmarks  Eyes: R normal, EOM's intact and PERRLA, L normal, EOM's intact and PERRLA  Nose: normal  Oropharynx: marked erythema  Neck: supple and no adenopathy  Lungs: normal and clear to auscultation  Heart: S1, S2 normal, no murmur, click, rub or gallop, regular rate and rhythm       Labs:     Results for orders placed or performed in visit on 07/01/18   Strep, Rapid Screen   Result Value Ref Range    Specimen Description Throat     Rapid Strep A Screen (A)      POSITIVE: Group A Streptococcal antigen detected by immunoassay.          ASSESSMENT:     (R07.0) Throat pain  (primary encounter diagnosis)  Plan: Strep, Rapid Screen    (J02.0) Strep throat  Plan: amoxicillin-clavulanate (AUGMENTIN) 875-125 MG         per tablet       PLAN:    Strep screen was positive and communicated to patient.  Rx for Augmentin today.  Symptomatic treatment with fluids, vaporizer, acetaminophen,salt water gargles, and ibuprofen.  Follow up with PCP as needed for persistence, worsening, appearance of new  symptoms.  Contagion reviewed.  Out of work/school until feeling better, or no fever without antipyretics for 24 hours.  Patient verbalized understanding and agreed with this plan.        Phong Baxter  7/1/2018, 12:14 PM

## 2018-10-01 ENCOUNTER — OFFICE VISIT (OUTPATIENT)
Dept: URGENT CARE | Facility: URGENT CARE | Age: 43
End: 2018-10-01
Payer: COMMERCIAL

## 2018-10-01 VITALS
TEMPERATURE: 98.5 F | HEART RATE: 81 BPM | WEIGHT: 163 LBS | OXYGEN SATURATION: 97 % | RESPIRATION RATE: 18 BRPM | SYSTOLIC BLOOD PRESSURE: 111 MMHG | DIASTOLIC BLOOD PRESSURE: 70 MMHG

## 2018-10-01 DIAGNOSIS — J02.0 STREP THROAT: ICD-10-CM

## 2018-10-01 DIAGNOSIS — R07.0 THROAT PAIN: Primary | ICD-10-CM

## 2018-10-01 LAB
DEPRECATED S PYO AG THROAT QL EIA: ABNORMAL
SPECIMEN SOURCE: ABNORMAL

## 2018-10-01 PROCEDURE — 87880 STREP A ASSAY W/OPTIC: CPT | Performed by: PHYSICIAN ASSISTANT

## 2018-10-01 PROCEDURE — 99213 OFFICE O/P EST LOW 20 MIN: CPT | Performed by: PHYSICIAN ASSISTANT

## 2018-10-01 RX ORDER — PENICILLIN V POTASSIUM 500 MG/1
500 TABLET, FILM COATED ORAL 2 TIMES DAILY
Qty: 20 TABLET | Refills: 0 | Status: SHIPPED | OUTPATIENT
Start: 2018-10-01 | End: 2018-10-11

## 2018-10-01 ASSESSMENT — ENCOUNTER SYMPTOMS
MYALGIAS: 0
CONSTITUTIONAL NEGATIVE: 1
EYE ITCHING: 0
NEUROLOGICAL NEGATIVE: 1
HEMATURIA: 0
PALPITATIONS: 0
NAUSEA: 0
HEADACHES: 0
ADENOPATHY: 0
WHEEZING: 0
DIZZINESS: 0
ENDOCRINE NEGATIVE: 1
MUSCULOSKELETAL NEGATIVE: 1
EYE REDNESS: 0
FEVER: 0
CHEST TIGHTNESS: 0
RESPIRATORY NEGATIVE: 1
CHILLS: 0
VOMITING: 0
EYE DISCHARGE: 0
SHORTNESS OF BREATH: 0
EYES NEGATIVE: 1
FREQUENCY: 0
WEAKNESS: 0
POLYDIPSIA: 0
DYSURIA: 0
LIGHT-HEADEDNESS: 0
COUGH: 0
SORE THROAT: 1
GASTROINTESTINAL NEGATIVE: 1
RHINORRHEA: 0
DIARRHEA: 0
ABDOMINAL PAIN: 0
CARDIOVASCULAR NEGATIVE: 1
DIAPHORESIS: 0

## 2018-10-01 NOTE — PROGRESS NOTES
Chief Complaint    Chief Complaint   Patient presents with     Pharyngitis     Since yesterday       HPI  Asa Shahid is an 42 year old male who presents for evaluation and treatment of sore throat and body aches.  Onset 1 day, unchanged since that time. Known Strep exposure: household exposure.    Patient is eating well with no problems swallowing.       ROS:      Review of Systems   Constitutional: Negative.  Negative for chills, diaphoresis and fever.   HENT: Positive for sore throat. Negative for congestion, ear pain and rhinorrhea.    Eyes: Negative.  Negative for discharge, redness and itching.   Respiratory: Negative.  Negative for cough, chest tightness, shortness of breath and wheezing.    Cardiovascular: Negative.  Negative for chest pain and palpitations.   Gastrointestinal: Negative.  Negative for abdominal pain, diarrhea, nausea and vomiting.   Endocrine: Negative.  Negative for polydipsia and polyuria.   Genitourinary: Negative for dysuria, frequency, hematuria and urgency.   Musculoskeletal: Negative.  Negative for myalgias.   Skin: Negative for rash.   Allergic/Immunologic: Negative for immunocompromised state.   Neurological: Negative.  Negative for dizziness, weakness, light-headedness and headaches.   Hematological: Negative for adenopathy.        Respiratory History  occasional episodes of bronchitis     Family History   History reviewed. No pertinent family history.     Problem history  There is no problem list on file for this patient.       Allergies  Allergies   Allergen Reactions     Seasonal Allergies         Social History  Social History     Social History     Marital status:      Spouse name: N/A     Number of children: N/A     Years of education: N/A     Occupational History     Not on file.     Social History Main Topics     Smoking status: Never Smoker     Smokeless tobacco: Never Used     Alcohol use No     Drug use: No     Sexual activity: Not on file     Other Topics  Concern     Not on file     Social History Narrative    ** Merged History Encounter **             Current Meds    Current Outpatient Prescriptions:      ACETAMINOPHEN PO, Take 1,000 mg by mouth every 6 hours as needed for pain, Disp: , Rfl:      loratadine (CLARITIN) 10 MG tablet, Take 10 mg by mouth daily, Disp: , Rfl:     OBJECTIVE     Vital signs noted and reviewed by Phong Baxter  /70 (BP Location: Left arm, Patient Position: Chair, Cuff Size: Adult Regular)  Pulse 81  Temp 98.5  F (36.9  C) (Oral)  Resp 18  Wt 163 lb (73.9 kg)  SpO2 97%     PEFR:  General appearance: healthy, alert and no distress  Ears: R TM - normal: no effusions, no erythema, and normal landmarks, L TM - normal: no effusions, no erythema, and normal landmarks  Eyes: R normal, L normal  Nose: boggy and clear discharge  Oropharynx: marked erythema  Neck: supple and no adenopathy  Lungs: normal and clear to auscultation  Heart: S1, S2 normal, no murmur, click, rub or gallop, regular rate and rhythm  Abdomen: Abdomen soft, non-tender without masses or organomegaly        Labs:     Results for orders placed or performed in visit on 10/01/18   Strep, Rapid Screen   Result Value Ref Range    Specimen Description Throat     Rapid Strep A Screen (A)      POSITIVE: Group A Streptococcal antigen detected by immunoassay.        ASSESSMENT:     (R07.0) Throat pain  (primary encounter diagnosis)  Plan: Strep, Rapid Screen    (J02.0) Strep throat  Plan: penicillin V potassium (VEETID) 500 MG tablet     PLAN:    Strep screen was positive and communicated to patient.  Rx for Penicillin today.  Symptomatic treatment with fluids, vaporizer, acetaminophen,salt water gargles, and ibuprofen.  Follow up with PCP as needed for persistence, worsening, appearance of new symptoms.  Contagion reviewed.  Out of work/school until feeling better, or no fever without antipyretics for 24 hours.  Worrisome symptoms discussed with instructions to go to the  ED.  Patient verbalized understanding and agreed with this plan.        Phong Baxter  10/1/2018, 11:38 AM

## 2018-10-01 NOTE — MR AVS SNAPSHOT
"              After Visit Summary   10/1/2018    Asa Shhaid    MRN: [id number]           Patient Information     Date Of Birth          1975        Visit Information        Provider Department      10/1/2018 11:35 AM Phong Baxter PA-C [facility]        Today's Diagnoses     Throat pain    -  1    Strep throat           Follow-ups after your visit        Who to contact     If you have questions or need follow up information about today's clinic visit or your schedule please contact [facility] directly at [phone].  Normal or non-critical lab and imaging results will be communicated to you by Brootahart, letter or phone within 4 business days after the clinic has received the results. If you do not hear from us within 7 days, please contact the clinic through Brootahart or phone. If you have a critical or abnormal lab result, we will notify you by phone as soon as possible.  Submit refill requests through DIY Genius or call your pharmacy and they will forward the refill request to us. Please allow 3 business days for your refill to be completed.          Additional Information About Your Visit        MyChart Information     DIY Genius lets you send messages to your doctor, view your test results, renew your prescriptions, schedule appointments and more. To sign up, go to [email or web address] . Click on \"Log in\" on the left side of the screen, which will take you to the Welcome page. Then click on \"Sign up Now\" on the right side of the page.     You will be asked to enter the access code listed below, as well as some personal information. Please follow the directions to create your username and password.     Your access code is: [email or web address]  Expires: 2018 12:02 PM     Your access code will  in 90 days. If you need help or a new code, please call your [facility] or [phone].        Care EveryWhere ID     This is your Care EveryWhere ID. This " could be used by other organizations to access your [facility] medical records  [id number]        Your Vitals Were     Pulse Temperature Respirations Pulse Oximetry          81 98.5  F (36.9  C) (Oral) 18 97%         Blood Pressure from Last 3 Encounters:   10/01/18 111/70   07/01/18 116/71   05/31/18 101/69    Weight from Last 3 Encounters:   10/01/18 163 lb (73.9 kg)   07/01/18 160 lb 3.2 oz (72.7 kg)   05/31/18 162 lb 3.2 oz (73.6 kg)              We Performed the Following     Strep, Rapid Screen          Today's Medication Changes          These changes are accurate as of 10/1/18 12:02 PM.  If you have any questions, ask your nurse or doctor.               Start taking these medicines.        Dose/Directions    penicillin V potassium 500 MG tablet   Commonly known as:  VEETID   Used for:  Strep throat   Started by:  Phong Baxter PA-C        Dose:  500 mg   Take 1 tablet (500 mg) by mouth 2 times daily for 10 days   Quantity:  20 tablet   Refills:  0            Where to get your medicines      These medications were sent to [facility] [id number] [place] [address] - [address] AT [facility] [address], [facility] [address]     Phone:  [phone]     penicillin V potassium 500 MG tablet                Primary Care Provider Fax #    Physician No Ref-Primary [phone]       No address on file        Equal Access to Services     GWEN EDGE AH: Hadii larry Cheatham, waaxda luqadaha, qaybta kaalmada annmarie, pacheco fierro [facility] Health[facility] hathaway. So [facility] [phone].    ATENCIÓN: Si habla español, tiene a aguilar disposición servicios gratuitos de asistencia lingüística. Llame al [phone].    We comply with applicable federal civil rights laws and [place] laws. We do not discriminate on the basis of race, color, national origin, age, disability, sex, sexual orientation, or gender identity.            Thank you!     Thank you for choosing [facility]  for your care. Our goal is always to provide you with excellent care. Hearing back from our patients is one way we can continue to improve our services. Please take a few minutes to complete the written survey that you may receive in the mail after your visit with us. Thank you!             Your Updated Medication List - Protect others around you: Learn how to safely use, store and throw away your medicines at www.disposemymeds.org.          This list is accurate as of 10/1/18 12:02 PM.  Always use your most recent med list.                   Brand Name Dispense Instructions for use Diagnosis    ACETAMINOPHEN PO      Take 1,000 mg by mouth every 6 hours as needed for pain        loratadine 10 MG tablet    CLARITIN     Take 10 mg by mouth daily        penicillin V potassium 500 MG tablet    VEETID    20 tablet    Take 1 tablet (500 mg) by mouth 2 times daily for 10 days    Strep throat

## 2018-11-11 ENCOUNTER — OFFICE VISIT (OUTPATIENT)
Dept: URGENT CARE | Facility: URGENT CARE | Age: 43
End: 2018-11-11
Payer: COMMERCIAL

## 2018-11-11 VITALS
WEIGHT: 162 LBS | DIASTOLIC BLOOD PRESSURE: 70 MMHG | TEMPERATURE: 98.1 F | OXYGEN SATURATION: 100 % | HEART RATE: 64 BPM | RESPIRATION RATE: 14 BRPM | SYSTOLIC BLOOD PRESSURE: 120 MMHG

## 2018-11-11 DIAGNOSIS — B02.9 HERPES ZOSTER WITHOUT COMPLICATION: Primary | ICD-10-CM

## 2018-11-11 PROCEDURE — 99213 OFFICE O/P EST LOW 20 MIN: CPT | Performed by: FAMILY MEDICINE

## 2018-11-11 RX ORDER — VALACYCLOVIR HYDROCHLORIDE 1 G/1
1000 TABLET, FILM COATED ORAL 3 TIMES DAILY
Qty: 21 TABLET | Refills: 0 | Status: SHIPPED | OUTPATIENT
Start: 2018-11-11

## 2018-11-11 NOTE — MR AVS SNAPSHOT
After Visit Summary   11/11/2018    Asa Shahid    MRN: [id number]           Patient Information     Date Of Birth          1975        Visit Information        Provider Department      11/11/2018 12:25 PM Sam Merrill MD [facility] [address]        Today's Diagnoses     Herpes zoster without complication    -  1      Care Instructions      Shingles  Shingles is a viral infection caused by the same virus that causes chicken pox. Anyone who has had chicken pox may get shingles later in life. The virus stays in the body, but remains asleep (dormant). Shingles often occurs in older persons or persons with lowered immunity. But it can affect anyone at any age.  Shingles starts as a tingling patch of skin on one side of the body. Small, painful blisters may then appear. The rash rarely spreads to other parts of the body.  Exposure to shingles can't cause shingles. However, it can cause chicken pox in anyone who has not had chicken pox or has not been vaccinated. The contagious period ends when all blisters have crusted over, generally 1 to 2 weeks after the illness starts.  After the blisters heal, the affected skin may be sensitive or painful for weeks or months, gradually resolving over time. But, sometimes this can last longer and be permanent (called postherpetic neuralgia.)  Shingles vaccines are available. Vaccination can help prevent shingles or make it less painful. It is generally recommended for adults older than 50, even if you've had singles in the past. Talk with your healthcare provider about when to get vaccinated and which vaccine is best for you.  Home care    Medicines may be prescribed to help relieve pain. Take these medicines as directed. Ask your healthcare provider or pharmacist before using over-the-counter medicines for helping treat pain and itching.    In certain cases, antiviral medicines may be prescribed to reduce pain, shorten the illness, and prevent  neuralgia. Take these medicines as directed.    Compresses made from a solution of cool water mixed with cornstarch or baking soda may help relieve pain and itching.     Gently wash skin daily with soap and water to help prevent infection. Be certain to rinse off all of the soap, which can be irritating.    Trim fingernails and try not to scratch. Scratching the sores may leave scars.    Stay home from work or school until all blisters have formed a crust and you are no longer contagious.  Follow-up care  Follow up with your healthcare provider, or as directed.  When to seek medical advice    Fever of 100.4 F (38 C) or higher, or as directed by your healthcare provider    Affected skin is on the face or neck and any of the following occur:  ? Headache  ? Eye pain  ? Changes in vision  ? Sores near the eye  ? Weakness of facial muscles    Blisters occurring on new areas of the body    Pain, redness, or swelling of a joint    Signs of skin infection: colored drainage from the sores, warmth, increasing redness, fever, or increasing pain   Date Last Reviewed: 4/1/2018 2000-2018 [employer]. [address] [place] [address]. All rights reserved. This information is not intended as a substitute for professional medical care. Always follow your healthcare professional's instructions.        Chickenpox (Adult)  Chickenpox is a very contagious illness caused by a virus. Symptoms include fever and an itchy red rash of small blisters that form all over the body. Blisters may appear on the scalp, face, and in the mouth. Women may also get blisters in the vaginal area. New blisters will appear over the first several days. The contagious period ends when all blisters have crusted over. This is usually about 1 week after the illness begins.  Most children older than 1 year and adults who have never had chickenpox can be vaccinated to prevent this illness.  The illness usually goes away in a few days, but  the virus that causes chickenpox remains in the body. Many years or decades later this can result in shingles.   Home care  Follow these guidelines when caring for yourself at home:    You may use acetaminophen or ibuprofen to control pain and fever, unless another medicine was prescribed. If you have chronic liver or kidney disease, talk with your healthcare provider before using these medicines. Also talk with your provider if you ve had a stomach ulcer or GI bleeding. Don t give aspirin to anyone younger age 18 who is ill with a fever. It may cause severe liver damage.    You can relieve itching and pain by mixing cool water with cornstarch, baking soda, and commercial oatmeal bath powder. The oatmeal bath powder is available without a prescription. Use this mixture as a compress for the area. This will soothe the skin. Calamine or another anti-itch lotion may help.    Diphenhydramine is an antihistamine available at grocery stores and pharmacies. You can use this medicine to ease itching over large areas of skin unless a prescription antihistamine was given. Use lower doses during the day and higher doses at bedtime. This is because the drug may make you sleepy. Loratidine, cetirizine, or fexofenadine are other antihistamines that you may use. They cause less drowsiness and are good choices for daytime use.    Bathe daily. Wash the rash with soap to stop infection.  Follow-up care  Follow up with your healthcare provider, or as advised, if the above tips don t bring relief.  When to seek medical advice  Call your healthcare provider right away if any of these occur:    Signs of skin infection. These include colored drainage from the sores and redness or tenderness of the sores that gets worse.    Excessive vomiting    Severe headache, stiff neck, or confusion    Joint pain, redness, or swelling    Dark urine, or yellow skin or eyes    Leg weakness or trouble walking    Fever of 100.4 F (38 C) or higher that  "doesn t get better with fever medicine      Date Last Reviewed: 2017-2018 [employer] [address] [place] [address], [place] [address]. All rights reserved. This information is not intended as a substitute for professional medical care. Always follow your healthcare professional's instructions.                Follow-ups after your visit        Who to contact     If you have questions or need follow up information about today's clinic visit or your schedule please contact [facility] directly at [phone].  Normal or non-critical lab and imaging results will be communicated to you by Dopplrhart, letter or phone within 4 business days after the clinic has received the results. If you do not hear from us within 7 days, please contact the clinic through webmet or phone. If you have a critical or abnormal lab result, we will notify you by phone as soon as possible.  Submit refill requests through Pidgon or call your pharmacy and they will forward the refill request to us. Please allow 3 business days for your refill to be completed.          Additional Information About Your Visit        DopplrharReflux Medical Information     Pidgon lets you send messages to your doctor, view your test results, renew your prescriptions, schedule appointments and more. To sign up, go to [email or web address] . Click on \"Log in\" on the left side of the screen, which will take you to the Welcome page. Then click on \"Sign up Now\" on the right side of the page.     You will be asked to enter the access code listed below, as well as some personal information. Please follow the directions to create your username and password.     Your access code is: [email or web address]  Expires: 2018 11:02 AM     Your access code will  in 90 days. If you need help or a new code, please call your [facility] or [phone].        Care EveryWhere ID     This is your Care EveryWhere ID. This could be used by other " organizations to access your [facility] medical records  [id number]        Your Vitals Were     Pulse Temperature Respirations Pulse Oximetry          64 98.1  F (36.7  C) (Oral) 14 100%         Blood Pressure from Last 3 Encounters:   11/11/18 120/70   10/01/18 111/70   07/01/18 116/71    Weight from Last 3 Encounters:   11/11/18 162 lb (73.5 kg)   10/01/18 163 lb (73.9 kg)   07/01/18 160 lb 3.2 oz (72.7 kg)              Today, you had the following     No orders found for display         Today's Medication Changes          These changes are accurate as of 11/11/18  2:25 PM.  If you have any questions, ask your nurse or doctor.               Start taking these medicines.        Dose/Directions    valACYclovir 1000 mg tablet   Commonly known as:  VALTREX   Used for:  Herpes zoster without complication   Started by:  Sam Merrill MD        Dose:  1000 mg   Take 1 tablet (1,000 mg) by mouth 3 times daily   Quantity:  21 tablet   Refills:  0            Where to get your medicines      These medications were sent to [facility] [address] [place], [place] - [address] AT [facility] [address], [facility] [address]     Phone:  [phone]     valACYclovir 1000 mg tablet                Primary Care Provider Fax #    Physician No Ref-Primary [phone]       No address on file        Equal Access to Services     GWEN EDGE AH: Hadii larry ku hadasho Soalysiaali, waaxda luqadaha, qaybta kaalmada adeegyada, pacheco sabillon . So [facility] [phone].    ATENCIÓN: Si habla español, tiene a aguilar disposición servicios gratuitos de asistencia lingüística. Llame al [phone].    We comply with applicable federal civil rights laws and [place] laws. We do not discriminate on the basis of race, color, national origin, age, disability, sex, sexual orientation, or gender identity.            Thank you!     Thank you for choosing [facility]  for your  care. Our goal is always to provide you with excellent care. Hearing back from our patients is one way we can continue to improve our services. Please take a few minutes to complete the written survey that you may receive in the mail after your visit with us. Thank you!             Your Updated Medication List - Protect others around you: Learn how to safely use, store and throw away your medicines at www.disposemymeds.org.          This list is accurate as of 11/11/18  2:25 PM.  Always use your most recent med list.                   Brand Name Dispense Instructions for use Diagnosis    ACETAMINOPHEN PO      Take 1,000 mg by mouth every 6 hours as needed for pain        loratadine 10 MG tablet    CLARITIN     Take 10 mg by mouth daily        valACYclovir 1000 mg tablet    VALTREX    21 tablet    Take 1 tablet (1,000 mg) by mouth 3 times daily    Herpes zoster without complication

## 2018-11-11 NOTE — PROGRESS NOTES
SUBJECTIVE:   Asa Shahid is a 42 year old male who presents to clinic today for the following health issues:    Chief Complaint   Patient presents with     Flank Pain       Duration: 1 week     Description (location/character/radiation): left flank/back pain, rash appeared about 1 week ago     Intensity:  moderate    Accompanying signs and symptoms: none     History (similar episodes/previous evaluation): None    Precipitating or alleviating factors: None    Therapies tried and outcome  otc analgesia          Problem list and histories reviewed & adjusted, as indicated.  Additional history: as documented    There is no problem list on file for this patient.    History reviewed. No pertinent surgical history.    Social History   Substance Use Topics     Smoking status: Never Smoker     Smokeless tobacco: Never Used     Alcohol use No     History reviewed. No pertinent family history.      Current Outpatient Prescriptions   Medication Sig Dispense Refill     ACETAMINOPHEN PO Take 1,000 mg by mouth every 6 hours as needed for pain       loratadine (CLARITIN) 10 MG tablet Take 10 mg by mouth daily       Allergies   Allergen Reactions     Seasonal Allergies      No lab results found.   BP Readings from Last 3 Encounters:   11/11/18 120/70   10/01/18 111/70   07/01/18 116/71    Wt Readings from Last 3 Encounters:   11/11/18 162 lb (73.5 kg)   10/01/18 163 lb (73.9 kg)   07/01/18 160 lb 3.2 oz (72.7 kg)                  Labs reviewed in EPIC    Reviewed and updated as needed this visit by clinical staff  Tobacco  Allergies  Meds  Med Hx  Surg Hx  Fam Hx  Soc Hx      Reviewed and updated as needed this visit by Provider         ROS:  Constitutional, HEENT, cardiovascular, pulmonary, gi and gu systems are negative, except as otherwise noted.    OBJECTIVE:     /70 (BP Location: Left arm, Patient Position: Sitting, Cuff Size: Adult Regular)  Pulse 64  Temp 98.1  F (36.7  C) (Oral)  Resp 14  Wt 162 lb  (73.5 kg)  SpO2 100%  There is no height or weight on file to calculate BMI.  GENERAL: healthy, alert and no distress  RESP: lungs clear to auscultation - no rales, rhonchi or wheezes  CV: regular rates and rhythm, normal S1 S2, no S3 or S4 and no murmur, click or rub  ABDOMEN: soft, nontender, no hepatosplenomegaly, no masses and bowel sounds normal  MS: no gross musculoskeletal defects noted, no edema  SKIN: erythematous rashes and group pattern involving left flank, dry in appearance, as shown below            ASSESSMENT/PLAN:         ICD-10-CM    1. Herpes zoster without complication B02.9 valACYclovir (VALTREX) 1000 mg tablet     Suspect symptoms secondary to herpes zoster.  Valacyclovir prescribed, common side effects discussed.  Suggested to continue well hydration, over-the-counter analgesia and instructed to keep the area covered.  Written information provided.  All questions answered.      Patient Instructions       Shingles  Shingles is a viral infection caused by the same virus that causes chicken pox. Anyone who has had chicken pox may get shingles later in life. The virus stays in the body, but remains asleep (dormant). Shingles often occurs in older persons or persons with lowered immunity. But it can affect anyone at any age.  Shingles starts as a tingling patch of skin on one side of the body. Small, painful blisters may then appear. The rash rarely spreads to other parts of the body.  Exposure to shingles can't cause shingles. However, it can cause chicken pox in anyone who has not had chicken pox or has not been vaccinated. The contagious period ends when all blisters have crusted over, generally 1 to 2 weeks after the illness starts.  After the blisters heal, the affected skin may be sensitive or painful for weeks or months, gradually resolving over time. But, sometimes this can last longer and be permanent (called postherpetic neuralgia.)  Shingles vaccines are available. Vaccination can help  prevent shingles or make it less painful. It is generally recommended for adults older than 50, even if you've had singles in the past. Talk with your healthcare provider about when to get vaccinated and which vaccine is best for you.  Home care    Medicines may be prescribed to help relieve pain. Take these medicines as directed. Ask your healthcare provider or pharmacist before using over-the-counter medicines for helping treat pain and itching.    In certain cases, antiviral medicines may be prescribed to reduce pain, shorten the illness, and prevent neuralgia. Take these medicines as directed.    Compresses made from a solution of cool water mixed with cornstarch or baking soda may help relieve pain and itching.     Gently wash skin daily with soap and water to help prevent infection. Be certain to rinse off all of the soap, which can be irritating.    Trim fingernails and try not to scratch. Scratching the sores may leave scars.    Stay home from work or school until all blisters have formed a crust and you are no longer contagious.  Follow-up care  Follow up with your healthcare provider, or as directed.  When to seek medical advice    Fever of 100.4 F (38 C) or higher, or as directed by your healthcare provider    Affected skin is on the face or neck and any of the following occur:  ? Headache  ? Eye pain  ? Changes in vision  ? Sores near the eye  ? Weakness of facial muscles    Blisters occurring on new areas of the body    Pain, redness, or swelling of a joint    Signs of skin infection: colored drainage from the sores, warmth, increasing redness, fever, or increasing pain   Date Last Reviewed: 4/1/2018 2000-2018 [employer]. [address] [place] [address], [place] [address]. All rights reserved. This information is not intended as a substitute for professional medical care. Always follow your healthcare professional's instructions.        Chickenpox (Adult)  Chickenpox is a very contagious  illness caused by a virus. Symptoms include fever and an itchy red rash of small blisters that form all over the body. Blisters may appear on the scalp, face, and in the mouth. Women may also get blisters in the vaginal area. New blisters will appear over the first several days. The contagious period ends when all blisters have crusted over. This is usually about 1 week after the illness begins.  Most children older than 1 year and adults who have never had chickenpox can be vaccinated to prevent this illness.  The illness usually goes away in a few days, but the virus that causes chickenpox remains in the body. Many years or decades later this can result in shingles.   Home care  Follow these guidelines when caring for yourself at home:    You may use acetaminophen or ibuprofen to control pain and fever, unless another medicine was prescribed. If you have chronic liver or kidney disease, talk with your healthcare provider before using these medicines. Also talk with your provider if you ve had a stomach ulcer or GI bleeding. Don t give aspirin to anyone younger age 18 who is ill with a fever. It may cause severe liver damage.    You can relieve itching and pain by mixing cool water with cornstarch, baking soda, and commercial oatmeal bath powder. The oatmeal bath powder is available without a prescription. Use this mixture as a compress for the area. This will soothe the skin. Calamine or another anti-itch lotion may help.    Diphenhydramine is an antihistamine available at grocery stores and pharmacies. You can use this medicine to ease itching over large areas of skin unless a prescription antihistamine was given. Use lower doses during the day and higher doses at bedtime. This is because the drug may make you sleepy. Loratidine, cetirizine, or fexofenadine are other antihistamines that you may use. They cause less drowsiness and are good choices for daytime use.    Bathe daily. Wash the rash with soap to stop  infection.  Follow-up care  Follow up with your healthcare provider, or as advised, if the above tips don t bring relief.  When to seek medical advice  Call your healthcare provider right away if any of these occur:    Signs of skin infection. These include colored drainage from the sores and redness or tenderness of the sores that gets worse.    Excessive vomiting    Severe headache, stiff neck, or confusion    Joint pain, redness, or swelling    Dark urine, or yellow skin or eyes    Leg weakness or trouble walking    Fever of 100.4 F (38 C) or higher that doesn t get better with fever medicine      Date Last Reviewed: 1/1/2017 2000-2018 [employer]. [address] [place] [address]. All rights reserved. This information is not intended as a substitute for professional medical care. Always follow your healthcare professional's instructions.            Sam Merrill MD  [facility]

## 2018-11-11 NOTE — PATIENT INSTRUCTIONS
Shingles  Shingles is a viral infection caused by the same virus that causes chicken pox. Anyone who has had chicken pox may get shingles later in life. The virus stays in the body, but remains asleep (dormant). Shingles often occurs in older persons or persons with lowered immunity. But it can affect anyone at any age.  Shingles starts as a tingling patch of skin on one side of the body. Small, painful blisters may then appear. The rash rarely spreads to other parts of the body.  Exposure to shingles can't cause shingles. However, it can cause chicken pox in anyone who has not had chicken pox or has not been vaccinated. The contagious period ends when all blisters have crusted over, generally 1 to 2 weeks after the illness starts.  After the blisters heal, the affected skin may be sensitive or painful for weeks or months, gradually resolving over time. But, sometimes this can last longer and be permanent (called postherpetic neuralgia.)  Shingles vaccines are available. Vaccination can help prevent shingles or make it less painful. It is generally recommended for adults older than 50, even if you've had singles in the past. Talk with your healthcare provider about when to get vaccinated and which vaccine is best for you.  Home care    Medicines may be prescribed to help relieve pain. Take these medicines as directed. Ask your healthcare provider or pharmacist before using over-the-counter medicines for helping treat pain and itching.    In certain cases, antiviral medicines may be prescribed to reduce pain, shorten the illness, and prevent neuralgia. Take these medicines as directed.    Compresses made from a solution of cool water mixed with cornstarch or baking soda may help relieve pain and itching.     Gently wash skin daily with soap and water to help prevent infection. Be certain to rinse off all of the soap, which can be irritating.    Trim fingernails and try not to scratch. Scratching the sores may leave  scars.    Stay home from work or school until all blisters have formed a crust and you are no longer contagious.  Follow-up care  Follow up with your healthcare provider, or as directed.  When to seek medical advice    Fever of 100.4 F (38 C) or higher, or as directed by your healthcare provider    Affected skin is on the face or neck and any of the following occur:  ? Headache  ? Eye pain  ? Changes in vision  ? Sores near the eye  ? Weakness of facial muscles    Blisters occurring on new areas of the body    Pain, redness, or swelling of a joint    Signs of skin infection: colored drainage from the sores, warmth, increasing redness, fever, or increasing pain   Date Last Reviewed: 4/1/2018 2000-2018 [employer]. [address] [place] [address], [place] [address]. All rights reserved. This information is not intended as a substitute for professional medical care. Always follow your healthcare professional's instructions.        Chickenpox (Adult)  Chickenpox is a very contagious illness caused by a virus. Symptoms include fever and an itchy red rash of small blisters that form all over the body. Blisters may appear on the scalp, face, and in the mouth. Women may also get blisters in the vaginal area. New blisters will appear over the first several days. The contagious period ends when all blisters have crusted over. This is usually about 1 week after the illness begins.  Most children older than 1 year and adults who have never had chickenpox can be vaccinated to prevent this illness.  The illness usually goes away in a few days, but the virus that causes chickenpox remains in the body. Many years or decades later this can result in shingles.   Home care  Follow these guidelines when caring for yourself at home:    You may use acetaminophen or ibuprofen to control pain and fever, unless another medicine was prescribed. If you have chronic liver or kidney disease, talk with your healthcare provider before  using these medicines. Also talk with your provider if you ve had a stomach ulcer or GI bleeding. Don t give aspirin to anyone younger age 18 who is ill with a fever. It may cause severe liver damage.    You can relieve itching and pain by mixing cool water with cornstarch, baking soda, and commercial oatmeal bath powder. The oatmeal bath powder is available without a prescription. Use this mixture as a compress for the area. This will soothe the skin. Calamine or another anti-itch lotion may help.    Diphenhydramine is an antihistamine available at grocery stores and pharmacies. You can use this medicine to ease itching over large areas of skin unless a prescription antihistamine was given. Use lower doses during the day and higher doses at bedtime. This is because the drug may make you sleepy. Loratidine, cetirizine, or fexofenadine are other antihistamines that you may use. They cause less drowsiness and are good choices for daytime use.    Bathe daily. Wash the rash with soap to stop infection.  Follow-up care  Follow up with your healthcare provider, or as advised, if the above tips don t bring relief.  When to seek medical advice  Call your healthcare provider right away if any of these occur:    Signs of skin infection. These include colored drainage from the sores and redness or tenderness of the sores that gets worse.    Excessive vomiting    Severe headache, stiff neck, or confusion    Joint pain, redness, or swelling    Dark urine, or yellow skin or eyes    Leg weakness or trouble walking    Fever of 100.4 F (38 C) or higher that doesn t get better with fever medicine      Date Last Reviewed: 1/1/2017 2000-2018 [employer]. [address] [place] [address]. All rights reserved. This information is not intended as a substitute for professional medical care. Always follow your healthcare professional's instructions.